# Patient Record
Sex: MALE | Race: WHITE | NOT HISPANIC OR LATINO
[De-identification: names, ages, dates, MRNs, and addresses within clinical notes are randomized per-mention and may not be internally consistent; named-entity substitution may affect disease eponyms.]

---

## 2021-12-01 ENCOUNTER — APPOINTMENT (OUTPATIENT)
Dept: PULMONOLOGY | Facility: CLINIC | Age: 41
End: 2021-12-01
Payer: COMMERCIAL

## 2021-12-01 VITALS
SYSTOLIC BLOOD PRESSURE: 120 MMHG | WEIGHT: 190 LBS | HEIGHT: 66 IN | DIASTOLIC BLOOD PRESSURE: 60 MMHG | BODY MASS INDEX: 30.53 KG/M2

## 2021-12-01 DIAGNOSIS — Z78.9 OTHER SPECIFIED HEALTH STATUS: ICD-10-CM

## 2021-12-01 DIAGNOSIS — G47.33 OBSTRUCTIVE SLEEP APNEA (ADULT) (PEDIATRIC): ICD-10-CM

## 2021-12-01 PROBLEM — Z00.00 ENCOUNTER FOR PREVENTIVE HEALTH EXAMINATION: Status: ACTIVE | Noted: 2021-12-01

## 2021-12-01 PROCEDURE — 99213 OFFICE O/P EST LOW 20 MIN: CPT

## 2021-12-01 NOTE — ASSESSMENT
[FreeTextEntry1] : 41-year-old man with obstructive sleep apnea on CPAP.  Patient has a ResMed machine that is currently using.\par \par Patient also has a Gianluca machine that he used to use and stopped using it after the recall is issued.  Patient does not have any symptoms at this time that are associated with recall.\par \par He registered Gianluca machine  and is waiting for replacement.  In the meanwhile patient will continue to use his ResMed machine which is still working and is not recalled.

## 2021-12-01 NOTE — HISTORY OF PRESENT ILLNESS
[FreeTextEntry1] : 41 -year-old male with history of Hodgkin's disease when he was 21 years old status post chemotherapy, obsessive compulsive disorder  is referred to the sleep Center to manage sleep apnea.  Patient had a sleep study in the past with diagnosis of mild obstructive sleep apnea, given his excessive daytime sleepiness he is placed on CPAP .  Patient's tiredness improved with the CPAP therapy.\par \par His theo machine is recalled and stopped using the cpap since.\par \par He also has resmed machine which is not recalled - he is currently using that machine.\par \par No history of headache, shortness of breath or cough (which are the common symptoms from the recalled theo machine). If any of those symptoms happen then he can do further testing.\par \par \par \par

## 2021-12-01 NOTE — PHYSICAL EXAM
[General Appearance - Well Developed] : well developed [General Appearance - Well Nourished] : well nourished [III] : III [Heart Sounds] : normal S1 and S2 [Murmurs] : no murmurs [] : no respiratory distress [Auscultation Breath Sounds / Voice Sounds] : lungs were clear to auscultation bilaterally [No Focal Deficits] : no focal deficits [Oriented To Time, Place, And Person] : oriented to person, place, and time [Memory Recent] : recent memory was not impaired